# Patient Record
Sex: FEMALE | Race: WHITE | Employment: FULL TIME | URBAN - NONMETROPOLITAN AREA
[De-identification: names, ages, dates, MRNs, and addresses within clinical notes are randomized per-mention and may not be internally consistent; named-entity substitution may affect disease eponyms.]

---

## 2023-09-26 ENCOUNTER — TELEPHONE (OUTPATIENT)
Dept: PSYCHIATRY | Age: 18
End: 2023-09-26

## 2023-09-26 NOTE — TELEPHONE ENCOUNTER
Called pt to schedule an appt from a referral.    Scheduled with Dr. Gab Hopson for 10/04/23 @ 1:30.     Notified the referring provider    Electronically signed by Marlee Harris MA on 9/26/2023 at 10:54 AM

## 2023-10-03 ENCOUNTER — TELEPHONE (OUTPATIENT)
Dept: PSYCHIATRY | Age: 18
End: 2023-10-03

## 2023-10-03 NOTE — TELEPHONE ENCOUNTER
Called pt on 10/02/23 for appointment reminder for 10/04/23  -left voicemail, requesting a return call      Electronically signed by Nav Castillo MA on 10/3/2023 at 10:37 AM

## 2023-10-04 ENCOUNTER — TELEPHONE (OUTPATIENT)
Dept: PSYCHIATRY | Age: 18
End: 2023-10-04

## 2023-10-04 NOTE — TELEPHONE ENCOUNTER
Patient no showed to their appointment in the [facility]. Office called the patient to check on them and to reschedule their appointment. Left voicemail for patient to call the office back at [phone] Option 3.     Electronically signed by Khloe Perez MA on 10/4/2023 at 4:05 PM

## 2023-10-10 ENCOUNTER — TELEPHONE (OUTPATIENT)
Dept: PSYCHIATRY | Age: 18
End: 2023-10-10

## 2023-10-10 NOTE — TELEPHONE ENCOUNTER
Call placed to follow up with patient, patient missed scheduled appointment on 10/4/2023, voicemail message left for patient to return call.

## 2025-04-04 ENCOUNTER — OFFICE VISIT (OUTPATIENT)
Dept: FAMILY MEDICINE CLINIC | Facility: CLINIC | Age: 20
End: 2025-04-04
Payer: COMMERCIAL

## 2025-04-04 VITALS
OXYGEN SATURATION: 98 % | HEART RATE: 99 BPM | RESPIRATION RATE: 18 BRPM | DIASTOLIC BLOOD PRESSURE: 86 MMHG | WEIGHT: 293 LBS | BODY MASS INDEX: 43.4 KG/M2 | TEMPERATURE: 98.7 F | SYSTOLIC BLOOD PRESSURE: 131 MMHG | HEIGHT: 69 IN

## 2025-04-04 DIAGNOSIS — H60.311 CHRONIC DIFFUSE OTITIS EXTERNA OF RIGHT EAR: ICD-10-CM

## 2025-04-04 DIAGNOSIS — H66.3X1 CHRONIC SUPPURATIVE OTITIS MEDIA OF RIGHT EAR, UNSPECIFIED OTITIS MEDIA LOCATION: Primary | ICD-10-CM

## 2025-04-04 DIAGNOSIS — Z12.4 PAP SMEAR FOR CERVICAL CANCER SCREENING: ICD-10-CM

## 2025-04-04 DIAGNOSIS — H91.91 HEARING LOSS OF RIGHT EAR, UNSPECIFIED HEARING LOSS TYPE: ICD-10-CM

## 2025-04-04 RX ORDER — PROPRANOLOL HCL 20 MG
50 TABLET ORAL 3 TIMES DAILY
COMMUNITY

## 2025-04-04 RX ORDER — OFLOXACIN 3 MG/ML
SOLUTION AURICULAR (OTIC)
Qty: 10 ML | Refills: 0 | Status: SHIPPED | OUTPATIENT
Start: 2025-04-04

## 2025-04-04 RX ORDER — DESVENLAFAXINE 25 MG/1
TABLET, EXTENDED RELEASE ORAL DAILY
COMMUNITY

## 2025-04-04 RX ORDER — CEFDINIR 300 MG/1
300 CAPSULE ORAL 2 TIMES DAILY
Qty: 20 CAPSULE | Refills: 0 | Status: SHIPPED | OUTPATIENT
Start: 2025-04-04 | End: 2025-04-14

## 2025-04-04 RX ORDER — ALBUTEROL SULFATE 90 UG/1
2 INHALANT RESPIRATORY (INHALATION) EVERY 4 HOURS PRN
COMMUNITY

## 2025-04-04 NOTE — LETTER
April 4, 2025     Patient: Rimma Forrester   YOB: 2005   Date of Visit: 4/4/2025       To Whom It May Concern:    It is my medical opinion that Rimma Forrester may return to work on 04/05/2025 .  Please excuse her from work on 4/4/25.           Sincerely,        JUSTIN Mahmood

## 2025-04-04 NOTE — PROGRESS NOTES
Subjective     Chief Complaint   Patient presents with    Earache     Right ear, painful and swelling for a year off and on. Stabbing pain. Feels like she is losing some hearing.        History of Present Illness    Patient presents today to establish care.  Right ear pain and swelling off an on for a year. Feels like she is losing some hearing in that ear.  History of recurrent ear infections as a child and had T&A as a child.  She feels like her ear drum ruptured about 6 months ago because she had a sudden sharp pain and pain has been constant since.  She has right sided sinus pain and posterior to her right ear.  She does get migraines on the right side of her headache and she does experience dizziness at time.  Took amoxicillin and otic drops 3 months ago and denies improvement.  She did have have a CT of her ear at that time at [facility] but we do not have records available and so will request them.      Patient's PMR from outside medical facility reviewed and noted.    Review of Systems     Otherwise complete ROS reviewed and negative except as mentioned in the HPI.    Past Medical History: History reviewed. No pertinent past medical history.  Past Surgical History:History reviewed. No pertinent surgical history.  Social History:  reports that she has never smoked. She has never used smokeless tobacco. She reports that she does not currently use alcohol. She reports that she does not use drugs.    Family History: family history is not on file.      Allergies:  Allergies   Allergen Reactions    Benadryl [Diphenhydramine] Hives and Mental Status Change    Wellbutrin [Bupropion] Mental Status Change     Suicidal thoughts     Medications:  Prior to Admission medications    Medication Sig Start Date End Date Taking? Authorizing Provider   albuterol sulfate  (90 Base) MCG/ACT inhaler Inhale 2 puffs Every 4 (Four) Hours As Needed for Wheezing.   Yes Provider, MD Wilma   amitriptyline (ELAVIL) 25 MG  "tablet Take 1 tablet by mouth Every Night. 1/23/25  Yes Wilma Mendoza MD   Desvenlafaxine Succinate ER 25 MG tablet sustained-release 24 hour Take  by mouth Daily.   Yes Wilma Mendoza MD   propranolol (INDERAL) 20 MG tablet Take 2.5 tablets by mouth 3 (Three) Times a Day.   Yes Wilma Mendoza MD   sertraline (ZOLOFT) 50 MG tablet Take 1 tablet by mouth Daily.   Yes Wilma Mendoza MD       VIOLETA:      PHQ-9 Depression Screening  Little interest or pleasure in doing things? Not at all   Feeling down, depressed, or hopeless? Not at all   PHQ-2 Total Score 0   Trouble falling or staying asleep, or sleeping too much?     Feeling tired or having little energy?     Poor appetite or overeating?     Feeling bad about yourself - or that you are a failure or have let yourself or your family down?     Trouble concentrating on things, such as reading the newspaper or watching television?     Moving or speaking so slowly that other people could have noticed? Or the opposite - being so fidgety or restless that you have been moving around a lot more than usual?     Thoughts that you would be better off dead, or of hurting yourself in some way?     PHQ-9 Total Score     If you checked off any problems, how difficult have these problems made it for you to do your work, take care of things at home, or get along with other people? Not difficult at all       PHQ-9 Total Score:   0  0 (Negative screening for depression)  Support given, observe for worsening symptoms    Objective     Vital Signs: /86 (BP Location: Right arm, Patient Position: Sitting, Cuff Size: Adult)   Pulse 99   Temp 98.7 °F (37.1 °C) (Infrared)   Resp 18   Ht 174 cm (68.5\")   Wt (!) 161 kg (354 lb)   SpO2 98%   BMI 53.04 kg/m²   Physical Exam  Vitals and nursing note reviewed.   Constitutional:       Appearance: She is morbidly obese.   HENT:      Head: Normocephalic.      Right Ear: Decreased hearing noted. Drainage and " "swelling present. Tympanic membrane is perforated.      Ears:      Comments: Erythema and edema to ear canal  Cardiovascular:      Rate and Rhythm: Normal rate and regular rhythm.      Pulses: Normal pulses.      Heart sounds: Normal heart sounds.   Pulmonary:      Effort: Pulmonary effort is normal.      Breath sounds: Normal breath sounds.   Musculoskeletal:         General: Normal range of motion.   Skin:     General: Skin is warm and dry.   Neurological:      General: No focal deficit present.      Mental Status: She is alert and oriented to person, place, and time.   Psychiatric:         Mood and Affect: Mood normal.         Behavior: Behavior normal.         Class 3 Severe Obesity (BMI >=40). Obesity-related health conditions include the following: none. Obesity is improving with treatment. BMI is is above average; BMI management plan is completed. We discussed low calorie, low carb based diet program, portion control, and increasing exercise.        Advance Care Planning            Results Reviewed:  No results found for: \"GLUCOSE\", \"BUN\", \"CREATININE\", \"NA\", \"K\", \"CL\", \"CO2\", \"CALCIUM\", \"ALT\", \"AST\", \"WBC\", \"HCT\", \"PLT\", \"CHOL\", \"TRIG\", \"HDL\", \"LDL\", \"LDLHDL\", \"HGBA1C\"      Assessment / Plan     Assessment/Plan     Diagnoses and all orders for this visit:    1. Chronic suppurative otitis media of right ear, unspecified otitis media location (Primary)  -     Ambulatory Referral to ENT (Otolaryngology)  -     cefdinir (OMNICEF) 300 MG capsule; Take 1 capsule by mouth 2 (Two) Times a Day for 10 days.  Dispense: 20 capsule; Refill: 0  -     ofloxacin (FLOXIN) 0.3 % otic solution; 5 drops in right ear twice a day x 7 days  Dispense: 10 mL; Refill: 0    2. Chronic diffuse otitis externa of right ear  -     Ambulatory Referral to ENT (Otolaryngology)  -     cefdinir (OMNICEF) 300 MG capsule; Take 1 capsule by mouth 2 (Two) Times a Day for 10 days.  Dispense: 20 capsule; Refill: 0  -     ofloxacin (FLOXIN) 0.3 % " otic solution; 5 drops in right ear twice a day x 7 days  Dispense: 10 mL; Refill: 0    3. Hearing loss of right ear, unspecified hearing loss type  -     Ambulatory Referral to ENT (Otolaryngology)  -     Ambulatory Referral to Audiology    4. Pap smear for cervical cancer screening  -     Ambulatory Referral to Obstetrics / Gynecology               An After Visit Summary was printed and given to the patient at discharge.  Return in about 1 year (around 4/4/2026) for Annual physical.    I have discussed the patient results/orders and plan/recommendation with them at today's visit.      Christin Crawley, APRN   04/04/2025

## 2025-04-07 ENCOUNTER — TELEPHONE (OUTPATIENT)
Dept: FAMILY MEDICINE CLINIC | Facility: CLINIC | Age: 20
End: 2025-04-07
Payer: COMMERCIAL

## 2025-04-07 NOTE — TELEPHONE ENCOUNTER
MARLYN CALLED FROM [facility] TO LET US KNOW THAT [facility] WILL HAVE AN APPT WITH THEM JULY 15TH AT 2:00 PM. TRIED CALLING [facility] TO INFORM HER OF THIS AND NO ANSWER. I'VE LEFT  FOR HER.

## 2025-04-15 ENCOUNTER — TELEPHONE (OUTPATIENT)
Dept: FAMILY MEDICINE CLINIC | Facility: CLINIC | Age: 20
End: 2025-04-15

## 2025-04-15 NOTE — TELEPHONE ENCOUNTER
Caller: Rimma Forrester    Relationship to patient: Self    Best call back number:     [phone]       Patient is needing: CALLING BACK, RECEIVED A PHONE CALL. PLEASE CALL PATIENT BACK

## 2025-04-17 ENCOUNTER — OFFICE VISIT (OUTPATIENT)
Dept: FAMILY MEDICINE CLINIC | Facility: CLINIC | Age: 20
End: 2025-04-17
Payer: COMMERCIAL

## 2025-04-17 VITALS
HEIGHT: 69 IN | WEIGHT: 293 LBS | TEMPERATURE: 98.6 F | HEART RATE: 108 BPM | SYSTOLIC BLOOD PRESSURE: 156 MMHG | OXYGEN SATURATION: 99 % | BODY MASS INDEX: 43.4 KG/M2 | DIASTOLIC BLOOD PRESSURE: 75 MMHG

## 2025-04-17 DIAGNOSIS — I10 PRIMARY HYPERTENSION: ICD-10-CM

## 2025-04-17 DIAGNOSIS — F33.2 SEVERE EPISODE OF RECURRENT MAJOR DEPRESSIVE DISORDER, WITHOUT PSYCHOTIC FEATURES: ICD-10-CM

## 2025-04-17 DIAGNOSIS — F42.9 OBSESSIVE-COMPULSIVE DISORDER, UNSPECIFIED TYPE: ICD-10-CM

## 2025-04-17 DIAGNOSIS — G43.E01 CHRONIC MIGRAINE WITH AURA AND WITH STATUS MIGRAINOSUS, NOT INTRACTABLE: Primary | ICD-10-CM

## 2025-04-17 DIAGNOSIS — F32.1 CURRENT MODERATE EPISODE OF MAJOR DEPRESSIVE DISORDER, UNSPECIFIED WHETHER RECURRENT: ICD-10-CM

## 2025-04-17 RX ORDER — MECLIZINE HYDROCHLORIDE 25 MG/1
TABLET ORAL
COMMUNITY

## 2025-04-17 RX ORDER — RIZATRIPTAN BENZOATE 10 MG/1
10 TABLET ORAL ONCE AS NEEDED
Qty: 9 TABLET | Refills: 2 | Status: SHIPPED | OUTPATIENT
Start: 2025-04-17

## 2025-04-17 RX ORDER — ESCITALOPRAM OXALATE 20 MG/1
20 TABLET ORAL DAILY
Qty: 30 TABLET | Refills: 2 | Status: SHIPPED | OUTPATIENT
Start: 2025-04-17

## 2025-04-17 RX ORDER — LISINOPRIL 10 MG/1
10 TABLET ORAL DAILY
Qty: 30 TABLET | Refills: 2 | Status: SHIPPED | OUTPATIENT
Start: 2025-04-17

## 2025-04-17 RX ORDER — LISINOPRIL 5 MG/1
1 TABLET ORAL DAILY
COMMUNITY
End: 2025-04-17

## 2025-04-17 RX ORDER — CARIPRAZINE 1.5 MG/1
1 CAPSULE, GELATIN COATED ORAL DAILY
COMMUNITY
Start: 2024-12-20 | End: 2025-04-17

## 2025-04-17 NOTE — PROGRESS NOTES
Subjective     Chief Complaint   Patient presents with    Earache    Headache    Depression     Discuss medication        Earache   Associated symptoms include headaches.   Headache  Depression  Her past medical history is significant for depression.     History of Present Illness  The patient presents for evaluation of headaches, depression, OCD, complex PTSD, and blood pressure management.    A few weeks prior, an ear infection was diagnosed, and medication was prescribed. Intermittent headaches are reported, managed with ibuprofen and Tylenol. Uncertainty exists regarding whether these headaches are related to the ear condition. The headaches are described as similar to previous migraines, with photophobia providing relief. Referral to an ENT specialist for further evaluation of the ear condition has been made. No nasal discharge or cough is reported. An antibiotic was previously prescribed and found effective, but intermittent headaches persist. A history of migraines is noted, and the current headaches are reminiscent of these.    Advice regarding antidepressant medication is sought due to financial constraints preventing consultation with a psychiatrist. Currently on desvenlafaxine, which is effective for depression but not for OCD and complex PTSD. A psychiatrist had previously attempted to transition from sertraline to desvenlafaxine, but uncertainty remains about the efficacy of desvenlafaxine for OCD and PTSD. The regimen includes 25 mg of sertraline and desvenlafaxine each. Vraylar was discontinued due to ineffectiveness and induction of suicidal thoughts. A diagnosis of bipolar disorder and severe OCD is noted. Despite being on a high dose of sertraline (100 mg), no benefit was found. OCD is trauma-based, characterized by intrusive thoughts and compulsive hand washing. Amitriptyline is helpful for panic attacks, but sertraline has been ineffective. Desvenlafaxine has been beneficial for  depression and suicidal thoughts, but not for OCD, which is currently the most distressing symptom. Wellbutrin and buspirone were tried, both inducing suicidal thoughts. Prozac was initially effective but lost efficacy over time. Lexapro has not been tried. Sertraline has been taken for approximately 2 weeks and desvenlafaxine for about 3 weeks.    Blood pressure monitoring has been advised due to a family history of hypertension on the father's side. Currently on propranolol 50 mg three times daily and lisinopril, which is reported as beneficial for dizziness and cardiac health.    SOCIAL HISTORY  She works at [employer]. She is not  and does not have children. She lives at home with her parents.    FAMILY HISTORY  High blood pressure runs on her father's side.      Past Medical History: History reviewed. No pertinent past medical history.  Past Surgical History:History reviewed. No pertinent surgical history.  Social History:  reports that she has never smoked. She has never been exposed to tobacco smoke. She has never used smokeless tobacco. She reports that she does not currently use alcohol. She reports that she does not use drugs.    Family History: family history includes Cancer in her maternal grandfather, maternal grandmother, paternal grandfather, and paternal grandmother.      Allergies:  Allergies   Allergen Reactions    Bupropion Mental Status Change and Other (See Comments)     Suicidal thoughts    Wellbutrin    Benadryl [Diphenhydramine] Hives and Mental Status Change    Buspirone Other (See Comments)     buspirone     Medications:  Prior to Admission medications    Medication Sig Start Date End Date Taking? Authorizing Provider   albuterol sulfate  (90 Base) MCG/ACT inhaler Inhale 2 puffs Every 4 (Four) Hours As Needed for Wheezing.   Yes Provider, MD Wilma   meclizine (ANTIVERT) 25 MG tablet TAKE 1 TABLET 3 TIMES A DAY BY ORAL ROUTE AS NEEDED FOR 30 DAYS, FOR DIZZINESS.   Yes  "Wilma Mendoza MD   amitriptyline (ELAVIL) 25 MG tablet Take 1 tablet by mouth Every Night. 1/23/25 4/17/25 Yes Wilma Mendoza MD   Desvenlafaxine Succinate ER 25 MG tablet sustained-release 24 hour Take  by mouth Daily.  4/17/25 Yes Wilma Mendoza MD   lisinopril (PRINIVIL,ZESTRIL) 5 MG tablet Take 1 tablet by mouth Daily.  4/17/25 Yes Wilma Mendoza MD   propranolol (INDERAL) 20 MG tablet Take 2.5 tablets by mouth 3 (Three) Times a Day.  4/17/25 Yes Wilma Mendoza MD   sertraline (ZOLOFT) 50 MG tablet Take 1 tablet by mouth Daily.  4/17/25 Yes Wilma Mendoza MD   escitalopram (Lexapro) 20 MG tablet Take 1 tablet by mouth Daily. 4/17/25   Abram Garcia MD   lisinopril (PRINIVIL,ZESTRIL) 10 MG tablet Take 1 tablet by mouth Daily. 4/17/25   Abram Garcia MD   rizatriptan (Maxalt) 10 MG tablet Take 1 tablet by mouth 1 (One) Time As Needed for Migraine for up to 27 doses. May repeat in 2 hours if needed 4/17/25   Abram Garcia MD   amitriptyline (ELAVIL) 25 MG tablet Take 1 tablet by mouth Daily.  Patient not taking: Reported on 4/17/2025 4/17/25  Wilma Mendoza MD   ofloxacin (FLOXIN) 0.3 % otic solution 5 drops in right ear twice a day x 7 days  Patient not taking: Reported on 4/17/2025 4/4/25 4/17/25  Christin Crawley APRN   Vraylar 1.5 MG capsule capsule Take 1 capsule by mouth Daily.  Patient not taking: Reported on 4/17/2025 12/20/24 4/17/25  Wilma Mendoza MD           Review of systems   negative unless otherwise specified above in HPI    Objective     Vital Signs: /75 (BP Location: Left arm, Patient Position: Sitting, Cuff Size: Large Adult)   Pulse 108   Temp 98.6 °F (37 °C) (Infrared)   Ht 174 cm (68.5\")   Wt (!) 164 kg (360 lb 12.8 oz)   SpO2 99%   BMI 54.06 kg/m²     Physical Exam  Vitals reviewed.   Constitutional:       Appearance: She is obese.   HENT:      Head: Normocephalic and atraumatic.      Right Ear: " "Tympanic membrane normal.      Left Ear: Tympanic membrane normal.      Nose: Nose normal.      Mouth/Throat:      Mouth: Mucous membranes are moist.      Pharynx: Oropharynx is clear.   Eyes:      Extraocular Movements: Extraocular movements intact.      Pupils: Pupils are equal, round, and reactive to light.   Cardiovascular:      Rate and Rhythm: Normal rate and regular rhythm.      Pulses: Normal pulses.   Pulmonary:      Effort: Pulmonary effort is normal.      Breath sounds: Normal breath sounds.   Abdominal:      General: Abdomen is flat. Bowel sounds are normal.      Palpations: Abdomen is soft.   Musculoskeletal:         General: Normal range of motion.      Cervical back: Normal range of motion and neck supple.   Skin:     General: Skin is warm and dry.      Capillary Refill: Capillary refill takes less than 2 seconds.   Neurological:      General: No focal deficit present.      Mental Status: She is alert and oriented to person, place, and time.   Psychiatric:         Mood and Affect: Mood normal.         Behavior: Behavior normal.         Thought Content: Thought content normal.         Judgment: Judgment normal.       Physical Exam  Ears: External ear canals and tympanic membranes intact  Mouth/Throat: Mucous membranes moist, no erythema, no exudate  Respiratory: Clear to auscultation, no wheezing, rales or rhonchi             Results Reviewed:  No results found for: \"GLUCOSE\", \"BUN\", \"CREATININE\", \"NA\", \"K\", \"CL\", \"CO2\", \"CALCIUM\", \"ALT\", \"AST\", \"WBC\", \"HCT\", \"PLT\", \"CHOL\", \"TRIG\", \"HDL\", \"LDL\", \"LDLHDL\", \"HGBA1C\"          Results          Assessment / Plan     Assessment/Plan:   Diagnosis Plan   1. Chronic migraine with aura and with status migrainosus, not intractable  rizatriptan (Maxalt) 10 MG tablet      2. Severe episode of recurrent major depressive disorder, without psychotic features  escitalopram (Lexapro) 20 MG tablet    Ambulatory Referral to Behavioral Health      3. Primary hypertension  " lisinopril (PRINIVIL,ZESTRIL) 10 MG tablet      4. Obsessive-compulsive disorder, unspecified type  Ambulatory Referral to Behavioral Health      5. Current moderate episode of major depressive disorder, unspecified whether recurrent  Ambulatory Referral to Behavioral Health          Assessment & Plan  1. Headaches.  - Symptoms suggest a possible sinus infection contributing to headaches.  - Physical exam findings indicate no rupture in the ear, and no cough or nasal discharge.  - Discussed the possibility of migraine headaches and recommended turning off lights for relief.  - Prescribed rizatriptan with instructions to dissolve in the mouth, lie down, and turn off the lights. Medication can be repeated after 2 hours if necessary.    2. Depression.  - Depression is currently managed with desvenlafaxine, which is effective for depression but not for OCD or PTSD.  - Physical exam findings and patient history indicate previous use of multiple antidepressants with varying effectiveness.  - Discussed discontinuing desvenlafaxine and sertraline, and initiating escitalopram 20 mg daily. Informed that it may take 2 to 4 weeks to observe noticeable effects.  - Referred to psychotherapy at [facility] due to financial constraints in accessing psychiatric services.    3. Obsessive-compulsive disorder.  - OCD symptoms include intrusive thoughts and compulsive hand washing.  - Physical exam findings and patient history indicate previous use of sertraline at varying doses with limited effectiveness.  - Discussed discontinuing desvenlafaxine and sertraline, and initiating escitalopram 20 mg daily. Informed that it may take 2 to 4 weeks to observe noticeable effects.  - Referred to psychotherapy at [facility] due to financial constraints in accessing psychiatric services.    4. Complex post-traumatic stress disorder.  - PTSD symptoms are trauma-based and not adequately managed by current medications.  - Physical exam findings  and patient history indicate previous use of multiple medications with limited effectiveness for PTSD.  - Discussed discontinuing desvenlafaxine and sertraline, and initiating escitalopram 20 mg daily. Informed that it may take 2 to 4 weeks to observe noticeable effects.  - Referred to psychotherapy at [facility] due to financial constraints in accessing psychiatric services.    5. Blood pressure management.  - Elevated blood pressure noted during the visit.  - Current medications include propranolol and lisinopril.  - Discussed increasing lisinopril to 10 mg daily. Option to take two 5 mg tablets until depleted or obtain new prescription for 10 mg tablets.  - Discontinued propranolol due to interaction with rizatriptan.    Follow-up  The patient will follow up in 2 weeks.      Return in about 2 weeks (around 5/1/2025). unless patient needs to be seen sooner or acute issues arise.      I have discussed the patient results/orders and and plan/recommendation with them at today's visit.      Signed by:    Dr. Abram Garcia Date: 04/17/25    EMR Dictation/Transcription disclaimer:   Some of this note may be an electronic transcription/translation of spoken language to printed text. The electronic translation of spoken language may permit erroneous, or at times, nonsensical words or phrases to be inadvertently transcribed; Although I have reviewed the note for such errors, some may still exist.      Patient or patient representative verbalized consent for the use of Ambient Listening during the visit with  Abram Garcia MD for chart documentation. 4/17/2025  14:55 CDT

## 2025-05-13 DIAGNOSIS — F33.2 SEVERE EPISODE OF RECURRENT MAJOR DEPRESSIVE DISORDER, WITHOUT PSYCHOTIC FEATURES: ICD-10-CM

## 2025-05-14 RX ORDER — ESCITALOPRAM OXALATE 20 MG/1
20 TABLET ORAL DAILY
Qty: 30 TABLET | Refills: 2 | OUTPATIENT
Start: 2025-05-14

## 2025-05-14 NOTE — TELEPHONE ENCOUNTER
Rx Refill Note  Requested Prescriptions     Pending Prescriptions Disp Refills    escitalopram (Lexapro) 20 MG tablet 30 tablet 2     Sig: Take 1 tablet by mouth Daily.      Last office visit with prescribing clinician: 4/17/2025   Last telemedicine visit with prescribing clinician: Visit date not found   Next office visit with prescribing clinician: Visit date not found                         Would you like a call back once the refill request has been completed: [] Yes [] No    If the office needs to give you a call back, can they leave a voicemail: [] Yes [] No    Praful Marquez MA  05/14/25, 08:25 CDT

## 2025-05-21 ENCOUNTER — TELEMEDICINE (OUTPATIENT)
Dept: FAMILY MEDICINE CLINIC | Facility: CLINIC | Age: 20
End: 2025-05-21
Payer: COMMERCIAL

## 2025-05-21 DIAGNOSIS — F43.10 PTSD (POST-TRAUMATIC STRESS DISORDER): ICD-10-CM

## 2025-05-21 DIAGNOSIS — F42.9 OBSESSIVE-COMPULSIVE DISORDER, UNSPECIFIED TYPE: ICD-10-CM

## 2025-05-21 DIAGNOSIS — F33.2 SEVERE EPISODE OF RECURRENT MAJOR DEPRESSIVE DISORDER, WITHOUT PSYCHOTIC FEATURES: Primary | ICD-10-CM

## 2025-05-21 PROCEDURE — 99213 OFFICE O/P EST LOW 20 MIN: CPT | Performed by: NURSE PRACTITIONER

## 2025-05-21 RX ORDER — FLUVOXAMINE MALEATE 50 MG
50 TABLET ORAL NIGHTLY
Qty: 30 TABLET | Refills: 0 | Status: SHIPPED | OUTPATIENT
Start: 2025-05-21

## 2025-05-21 NOTE — PROGRESS NOTES
"      Chief Complaint  No chief complaint on file.    Subjective           Rimma Forrester presents to [facility]  History of Present Illness  Follow up on Depression and PTSD, and OCD  Not seeing improvement with Lexapro.  She notes her heart rate is elevated up to 150 bpm; both with panic attacks and without.  She was seeing pyshciatry but is unable to afford the copay.  Tried and failed sertaline, pristiq, prozac, effexor, vraylar, paxil.  Last dose was yesterday, accidentally missed a few days prior to that.      Objective   Vital Signs:   There were no vitals taken for this visit.    Estimated body mass index is 54.06 kg/m² as calculated from the following:    Height as of 4/17/25: 174 cm (68.5\").    Weight as of 4/17/25: 164 kg (360 lb 12.8 oz).     Physical Exam   Constitutional: She appears well-developed.   HENT:   Head: Normocephalic.   Eyes: Conjunctivae are normal.   Pulmonary/Chest: Effort normal.   Neurological: She is alert.   Skin: Skin is warm.     Result Review :                   Assessment and Plan      Diagnoses and all orders for this visit:    1. Severe episode of recurrent major depressive disorder, without psychotic features (Primary)  -     fluvoxaMINE (LUVOX) 50 MG tablet; Take 1 tablet by mouth Every Night.  Dispense: 30 tablet; Refill: 0    2. Obsessive-compulsive disorder, unspecified type  -     fluvoxaMINE (LUVOX) 50 MG tablet; Take 1 tablet by mouth Every Night.  Dispense: 30 tablet; Refill: 0    3. PTSD (post-traumatic stress disorder)  -     fluvoxaMINE (LUVOX) 50 MG tablet; Take 1 tablet by mouth Every Night.  Dispense: 30 tablet; Refill: 0    Start Luvox 50 mg daily and follow up in 4 weeks.        Follow Up     Return in about 4 weeks (around 6/18/2025) for Follow up depression.  Patient was given instructions and counseling regarding her condition or for health maintenance advice. Please see specific information pulled into the AVS if appropriate. "     Mode of Visit: Video  Location of patient: home  Location of provider: [facility]  You have chosen to receive care through a telehealth visit.  The patient has signed the video visit consent form.  The visit included audio and video interaction. No technical issues occurred during this visit.    Yes Yes Yes

## 2025-05-22 ENCOUNTER — PATIENT MESSAGE (OUTPATIENT)
Dept: FAMILY MEDICINE CLINIC | Facility: CLINIC | Age: 20
End: 2025-05-22
Payer: COMMERCIAL

## 2025-05-22 DIAGNOSIS — F33.2 SEVERE EPISODE OF RECURRENT MAJOR DEPRESSIVE DISORDER, WITHOUT PSYCHOTIC FEATURES: Primary | ICD-10-CM

## 2025-05-22 DIAGNOSIS — F42.9 OBSESSIVE-COMPULSIVE DISORDER, UNSPECIFIED TYPE: ICD-10-CM

## 2025-05-27 DIAGNOSIS — R11.0 NAUSEA: Primary | ICD-10-CM

## 2025-05-27 RX ORDER — ONDANSETRON 4 MG/1
4 TABLET, ORALLY DISINTEGRATING ORAL EVERY 8 HOURS PRN
Qty: 30 TABLET | Refills: 0 | Status: SHIPPED | OUTPATIENT
Start: 2025-05-27

## 2025-06-09 ENCOUNTER — TELEPHONE (OUTPATIENT)
Dept: FAMILY MEDICINE CLINIC | Facility: CLINIC | Age: 20
End: 2025-06-09